# Patient Record
Sex: FEMALE | Race: BLACK OR AFRICAN AMERICAN
[De-identification: names, ages, dates, MRNs, and addresses within clinical notes are randomized per-mention and may not be internally consistent; named-entity substitution may affect disease eponyms.]

---

## 2019-02-12 ENCOUNTER — HOSPITAL ENCOUNTER (EMERGENCY)
Dept: HOSPITAL 74 - JER | Age: 44
Discharge: HOME | End: 2019-02-12
Payer: COMMERCIAL

## 2019-02-12 VITALS — HEART RATE: 76 BPM | SYSTOLIC BLOOD PRESSURE: 117 MMHG | DIASTOLIC BLOOD PRESSURE: 68 MMHG

## 2019-02-12 VITALS — BODY MASS INDEX: 31.5 KG/M2

## 2019-02-12 VITALS — TEMPERATURE: 98 F

## 2019-02-12 DIAGNOSIS — R07.9: Primary | ICD-10-CM

## 2019-02-12 DIAGNOSIS — I10: ICD-10-CM

## 2019-02-12 DIAGNOSIS — R73.03: ICD-10-CM

## 2019-02-12 DIAGNOSIS — E78.5: ICD-10-CM

## 2019-02-12 DIAGNOSIS — M94.0: ICD-10-CM

## 2019-02-12 LAB
ALBUMIN SERPL-MCNC: 3.3 G/DL (ref 3.4–5)
ALP SERPL-CCNC: 44 U/L (ref 45–117)
ALT SERPL-CCNC: 17 U/L (ref 13–61)
ANION GAP SERPL CALC-SCNC: 7 MMOL/L (ref 8–16)
APPEARANCE UR: CLEAR
AST SERPL-CCNC: 15 U/L (ref 15–37)
BASOPHILS # BLD: 0.9 % (ref 0–2)
BILIRUB SERPL-MCNC: 0.2 MG/DL (ref 0.2–1)
BILIRUB UR STRIP.AUTO-MCNC: NEGATIVE MG/DL
BUN SERPL-MCNC: 11 MG/DL (ref 7–18)
CALCIUM SERPL-MCNC: 8.9 MG/DL (ref 8.5–10.1)
CHLORIDE SERPL-SCNC: 106 MMOL/L (ref 98–107)
CO2 SERPL-SCNC: 27 MMOL/L (ref 21–32)
COLOR UR: (no result)
CREAT SERPL-MCNC: 0.7 MG/DL (ref 0.55–1.3)
DEPRECATED RDW RBC AUTO: 15 % (ref 11.6–15.6)
EOSINOPHIL # BLD: 4.7 % (ref 0–4.5)
GLUCOSE SERPL-MCNC: 87 MG/DL (ref 74–106)
HCT VFR BLD CALC: 35.4 % (ref 32.4–45.2)
HGB BLD-MCNC: 12 GM/DL (ref 10.7–15.3)
KETONES UR QL STRIP: NEGATIVE
LEUKOCYTE ESTERASE UR QL STRIP.AUTO: NEGATIVE
LYMPHOCYTES # BLD: 36 % (ref 8–40)
MCH RBC QN AUTO: 26.3 PG (ref 25.7–33.7)
MCHC RBC AUTO-ENTMCNC: 33.9 G/DL (ref 32–36)
MCV RBC: 77.5 FL (ref 80–96)
MONOCYTES # BLD AUTO: 10.8 % (ref 3.8–10.2)
NEUTROPHILS # BLD: 47.6 % (ref 42.8–82.8)
NITRITE UR QL STRIP: NEGATIVE
PH UR: 6 [PH] (ref 5–8)
PLATELET # BLD AUTO: 305 K/MM3 (ref 134–434)
PMV BLD: 8.3 FL (ref 7.5–11.1)
POTASSIUM SERPLBLD-SCNC: 4 MMOL/L (ref 3.5–5.1)
PROT SERPL-MCNC: 7.2 G/DL (ref 6.4–8.2)
PROT UR QL STRIP: NEGATIVE
PROT UR QL STRIP: NEGATIVE
RBC # BLD AUTO: 4.57 M/MM3 (ref 3.6–5.2)
SODIUM SERPL-SCNC: 140 MMOL/L (ref 136–145)
SP GR UR: 1.02 (ref 1.01–1.03)
UROBILINOGEN UR STRIP-MCNC: NEGATIVE MG/DL (ref 0.2–1)
WBC # BLD AUTO: 5.5 K/MM3 (ref 4–10)

## 2019-02-12 PROCEDURE — 3E0337Z INTRODUCTION OF ELECTROLYTIC AND WATER BALANCE SUBSTANCE INTO PERIPHERAL VEIN, PERCUTANEOUS APPROACH: ICD-10-PCS | Performed by: STUDENT IN AN ORGANIZED HEALTH CARE EDUCATION/TRAINING PROGRAM

## 2019-02-12 NOTE — PDOC
History of Present Illness





- General


Chief Complaint: Lightheaded


Stated Complaint: PAIN


Time Seen by Provider: 02/12/19 10:30


History Source: Patient


Exam Limitations: No Limitations





- History of Present Illness


Initial Comments: 








44 yo F w a pmh of hypertension, prediabetes, anemia, and hyperlipidemia 

presents to the ER after she experienced an episode of right sided stabbing 

chest discomfort earlier today which she says radiated up to her neck and down 

her right arm. She says the pain comes out of nowhere and is not associated 

with physical exertion. She does endorse right arm numbness when the discomfort 

comes on. She is not currently experiencing any chest discomfort. When the 

chest discomfort happens she often feels lightheaded. She denies any 

diaphoresis associated with these episodes. She endorses having the flu 3 weeks 

ago, otherwise no recent infections. 





She denies any fevers, chills, SOB, difficulty breathing, dysuria, frequency, 

urgency, abdominal pain, diarrhea, constipation, headache, blurry vision, neck 

pain, leg pain, vertigo, or weakness. 





PCP: Sg Luke


PSH: Myomectomy


Allergies: NKA, NKDA


Social Hx: Denies smoking, drinking, or other substance usage











Past History





- Past Medical History


Allergies/Adverse Reactions: 


 Allergies











Allergy/AdvReac Type Severity Reaction Status Date / Time


 


No Known Drug Allergies Allergy   Verified 02/12/19 10:18











Home Medications: 


Ambulatory Orders





Amlodipine Besylate [Norvasc -] 10 mg PO DAILY 02/12/19 


Metoprolol Succinate 25 mg PO DAILY 02/12/19 








Anemia: Yes


Asthma: No


Cancer: No


Cardiac Disorders: No


CVA: No


COPD: No


CHF: No


Dementia: No


Diabetes: Yes (PRE)


GI Disorders: No


 Disorders: No


HTN: Yes


Hypercholesterolemia: Yes


Liver Disease: No


Seizures: No


Thyroid Disease: No





- Surgical History


Abdominal Surgery: No


Appendectomy: No


Cardiac Surgery: No


Cholecystectomy: No


Lung Surgery: No


Neurologic Surgery: No


Orthopedic Surgery: No





- Suicide/Smoking/Psychosocial Hx


Smoking Status: No


Smoking History: Never smoked


Number of Cigarettes Smoked Daily: 0


Hx Alcohol Use: No


Drug/Substance Use Hx: No


Substance Use Type: None


Hx Substance Use Treatment: No





**Review of Systems





- Review of Systems


Able to Perform ROS?: Yes


Comments:: 








CONSTITUTIONAL:


Absent: fever, no chills, no fatigue


EYES:


Absent: visual changes


ENT:


Absent: ear pain, no sore throat


CARDIOVASCULAR:


Present: Chest pain, lightheadedness. 


Absent: no palpitations


RESPIRATORY:


Absent: cough, no SOB


GI:


Absent: abdominal pain, no nausea, no vomiting, no constipation, no diarrhea


GENITOURINARY:


Absent: dysuria, no frequency, no hematuria


MUSKULOSKELETAL:


Absent: back pain, no arthralgia, no myalgia


SKIN:


Absent: rash


NEURO:


Absent: headache











*Physical Exam





- Vital Signs


 Last Vital Signs











Temp Pulse Resp BP Pulse Ox


 


 98 F   91 H  19   145/95   100 


 


 02/12/19 10:18  02/12/19 10:18  02/12/19 10:18  02/12/19 10:18  02/12/19 10:18














- Physical Exam


Comments: 





GENERAL:


Well-appearing, well-nourished. No apparent distress.


HEENT:


Normocephalic, atraumatic. PERRL, EOM intact.


CARDIOVASCULAR:


Normal S1, S2. Regular rate and rhythm.


CHEST: 


There is point tenderness at the costochondral junction which is reproducible 

and worse with right arm motion. 


PULMONARY:


No evidence of respiratory distress. Lungs clear to auscultation bilaterally. 

No wheezing, rales or rhonchi.


ABDOMEN:


Soft, non-distended, non-tender.


EXTREMITIES:


Normal ROM in all four extremities. No gross deformities.


SKIN:


Warm, dry.  No rash


NEUROLOGICAL:


No focal neurological deficits.














Moderate Sedation





- Procedure Monitoring


Vital Signs: 


Procedure Monitoring Vital Signs











Temperature  98 F   02/12/19 10:18


 


Pulse Rate  91 H  02/12/19 10:18


 


Respiratory Rate  19   02/12/19 10:18


 


Blood Pressure  145/95   02/12/19 10:18


 


O2 Sat by Pulse Oximetry (%)  100   02/12/19 10:18











ED Treatment Course





- LABORATORY


CBC & Chemistry Diagram: 


 02/12/19 11:15





 02/12/19 11:20





Medical Decision Making





- Medical Decision Making





44 yo F w a pmh of hypertension, prediabetes, anemia, and hyperlipidemia 

presents to the ER after she experienced an episode of right sided stabbing 

chest discomfort earlier today which she says radiated up to her neck and down 

her right arm. She says the pain comes out of nowhere and is not associated 

with physical exertion. She does endorse right arm numbness when the discomfort 

comes on. She is not currently experiencing any chest discomfort. When the 

chest discomfort happens she often feels lightheaded. She denies any 

diaphoresis associated with these episodes. She endorses having the flu 3 weeks 

ago, otherwise no recent infections. 





- VSS





DDx IBNLT: ACS/MI, arrhythmia, electrolyte abnormality, costochondritis, post-

viral pleuritis, dehydration, pneumothorax, PNA





Plan: Labs, Urine, CXR, EKG, analgesia, IV hydration, re-assess. 





Labs unremarkable and WNL


CXR clean


EKG shows no ST segment elevation or depression. Not concerning for ACS. 





The point tenderness at the costochondral junction is suggestive of 

costochondritis. 





Patient feels better after analgesia. 


Will DC with cardio follow up. 











*DC/Admit/Observation/Transfer


Diagnosis at time of Disposition: 


 Atypical chest pain, Costochondritis








- Discharge Dispostion


Disposition: HOME


Condition at time of disposition: Improved


Decision to Admit order: No





- Referrals


Referrals: 


Reggie Mcdonald MD [Staff Physician] - 





- Patient Instructions


Printed Discharge Instructions:  DI for Atypical Chest Pain, DI for 

Costochondritis, DI for Chest Pain


Additional Instructions: 


You came into the ER after experiencing some right sided rib discomfort. We 

looked at your blood and urine and found no abnormalities. We also did a chest x

-ray and and electrocardiogram which were normal and had no obvious 

abnormalities. We believe your chest discomfort is musculoskeletal in nature, 

possibly costochondritis - see attached handout for further explanation. We are 

giving you the number for a cardiologist to follow up with. Please make sure to 

schedule an appointment in the next 48 to 72 hours. 





Take advil/motrin/tylenol as needed for pain control. Drink plenty of fluids. 





Come back to the ER if your pain worsens, you get a fever, can't breathe or 

have any other new or worsening concerns. 





Thank you for coming to the [facility] ER. We hope you feel better soon! 


Print Language: ENGLISH





- Post Discharge Activity

## 2019-02-12 NOTE — EKG
Test Reason : 

Blood Pressure : ***/*** mmHG

Vent. Rate : 093 BPM     Atrial Rate : 093 BPM

   P-R Int : 140 ms          QRS Dur : 086 ms

    QT Int : 382 ms       P-R-T Axes : 048 -11 017 degrees

   QTc Int : 474 ms

 

SINUS RHYTHM WITH PREMATURE ATRIAL COMPLEXES WITH ABERRANT CONDUCTION

MINIMAL VOLTAGE CRITERIA FOR LVH, MAY BE NORMAL VARIANT

SEPTAL INFARCT , AGE UNDETERMINED

ABNORMAL ECG

WHEN COMPARED WITH ECG OF 29-APR-2015 15:02,

ABERRANT CONDUCTION IS NOW PRESENT

SEPTAL INFARCT IS NOW PRESENT

NON-SPECIFIC CHANGE IN ST SEGMENT IN ANTERIOR LEADS

Confirmed by Mitul Morfin ([id number]) on 2/12/2019 4:24:13 PM

 

Referred By:             Confirmed By:Mitul Morfin

## 2019-02-12 NOTE — PDOC
Attending Attestation





- Resident


Resident Name: Khoi Gómez





- ED Attending Attestation


I have performed the following: I have examined & evaluated the patient, The 

case was reviewed & discussed with the resident, I agree w/resident's findings 

& plan, Exceptions are as noted





- HPI


HPI: 





02/12/19 11:24


43y F hx of htn, hl, pre-dm presents with complaint of cp, dizziness. Pt states 

she was feeling well this morning, was at work was drinking coffee and got up 

to walk around and noticed she started feeling lightheaded like she was about 

to pass out associated with palpitations, mild R sharp sided chest pain, 

tingling in her arms lasting a few seconds before resolving. associated with 

nausea w/o vomiting, diaphoresis. Pt denies any sob,hemoptysis, cough, vertigo, 

focal weakness, diarrhea, melena, bpr, dysuria, frequency. Pt notes she had 

similar symptoms last year wand went to an ER in [place] and was observed.  She was 

given fu with primary care.  





notes she is currently feeling better.











- Physicial Exam


PE: 





02/12/19 11:28


GENERAL: The patient is awake, alert, and fully oriented, Nontoxic - in no 

acute distress.


HEAD: Normocephalic, atraumatic.


EYES: extraocular movements intact, sclera anicteric, conjunctiva clear.


ENT: Normal voice,  Moist mucous membranes.


NECK: Normal range of motion, supple


LUNGS: Breath sounds equal, clear to auscultation bilaterally.  No wheezes, no 

rhonchi, no rales.


HEART: Regular rate and rhythm, normal S1 and S2 without murmur, rub or gallop.


ABDOMEN: Soft, nontender,  No guarding, no rebound.  . No CVA tenderness


EXTREMITIES: Normal range of motion, no edema.  No clubbing or cyanosis. No 

cords, erythema, or tenderness.


NEUROLOGICAL: No facial assymetry, Normal speech, 


PSYCH: Normal mood, normal affect.


SKIN: Warm, Dry, normal turgor,





- Medical Decision Making





02/12/19 11:28


ddx is wide and includes but is not limieted to arrthmia, acs, vertigo 

metabolic dernagement, anemia


will ck labs, screening ekg and trop


fluids for hydration


bp on both arms are symmetric 138/92 on R arm and 136/86 on L arm, so do not 

think dissection likely (her symptoms of pain lasting for split second then 

resolving is also inconsistent)





will reassess








02/12/19 13:09


pt feelig improved


lbs reivewed


pt does have worsening of her symptoms with movement of her arm and is ttp


suspect possible msk cause


will recommend uspportive care











**Heart Score/ECG Review





- ECG Impressions


Comment:: 





02/12/19 11:46


Twelve-lead EKG was performed and reviewed by me. 


There is normal sinus rhythm with a normal rate. 


rate of 93


The axis is normal. 


The intervals are normal. 


abnormal R wave progression

## 2022-04-03 ENCOUNTER — HOSPITAL ENCOUNTER (EMERGENCY)
Dept: HOSPITAL 74 - JERFT | Age: 47
Discharge: HOME | End: 2022-04-03
Payer: COMMERCIAL

## 2022-04-03 VITALS — SYSTOLIC BLOOD PRESSURE: 124 MMHG | TEMPERATURE: 98 F | HEART RATE: 79 BPM | DIASTOLIC BLOOD PRESSURE: 85 MMHG

## 2022-04-03 VITALS — BODY MASS INDEX: 33.6 KG/M2
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
## 2022-04-03 DIAGNOSIS — S93.401A: Primary | ICD-10-CM

## 2023-08-02 ENCOUNTER — HOSPITAL ENCOUNTER (EMERGENCY)
Dept: HOSPITAL 74 - JER | Age: 48
Discharge: HOME | End: 2023-08-02
Payer: COMMERCIAL

## 2023-08-02 VITALS — HEART RATE: 72 BPM | TEMPERATURE: 98.5 F | SYSTOLIC BLOOD PRESSURE: 138 MMHG | DIASTOLIC BLOOD PRESSURE: 78 MMHG

## 2023-08-02 VITALS — RESPIRATION RATE: 18 BRPM

## 2023-08-02 VITALS — BODY MASS INDEX: 34.2 KG/M2
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
## 2023-08-02 DIAGNOSIS — M25.511: ICD-10-CM

## 2023-08-02 DIAGNOSIS — M25.551: ICD-10-CM

## 2023-08-02 DIAGNOSIS — R53.1: ICD-10-CM

## 2023-08-02 DIAGNOSIS — M25.512: ICD-10-CM

## 2023-08-02 DIAGNOSIS — R11.0: ICD-10-CM
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
## 2023-08-02 DIAGNOSIS — R10.13: Primary | ICD-10-CM

## 2023-08-02 LAB
ALBUMIN SERPL-MCNC: 3.5 G/DL (ref 3.4–5)
ALP SERPL-CCNC: 69 U/L (ref 45–117)
ALT SERPL-CCNC: 32 U/L (ref 13–61)
ANION GAP SERPL CALC-SCNC: 4 MMOL/L (ref 8–16)
APPEARANCE UR: CLEAR
AST SERPL-CCNC: 19 U/L (ref 15–37)
BASOPHILS # BLD: 0.4 % (ref 0–2)
BILIRUB SERPL-MCNC: 0.2 MG/DL (ref 0.2–1)
BILIRUB UR STRIP.AUTO-MCNC: NEGATIVE MG/DL
BUN SERPL-MCNC: 11.5 MG/DL (ref 7–18)
CALCIUM SERPL-MCNC: 9.5 MG/DL (ref 8.5–10.1)
CHLORIDE SERPL-SCNC: 108 MMOL/L (ref 98–107)
CO2 SERPL-SCNC: 29 MMOL/L (ref 21–32)
COLOR UR: YELLOW
CREAT SERPL-MCNC: 0.8 MG/DL (ref 0.55–1.3)
DEPRECATED RDW RBC AUTO: 14.1 % (ref 11.6–15.6)
EOSINOPHIL # BLD: 2.1 % (ref 0–4.5)
GLUCOSE SERPL-MCNC: 108 MG/DL (ref 74–106)
HCT VFR BLD CALC: 40.8 % (ref 32.4–45.2)
HGB BLD-MCNC: 14 GM/DL (ref 10.7–15.3)
INR BLD: 1.08 (ref 0.83–1.09)
KETONES UR QL STRIP: NEGATIVE
LEUKOCYTE ESTERASE UR QL STRIP.AUTO: NEGATIVE
LYMPHOCYTES # BLD: 34 % (ref 8–40)
MCH RBC QN AUTO: 27.9 PG (ref 25.7–33.7)
MCHC RBC AUTO-ENTMCNC: 34.4 G/DL (ref 32–36)
MCV RBC: 81.3 FL (ref 80–96)
MONOCYTES # BLD AUTO: 6.9 % (ref 3.8–10.2)
NEUTROPHILS # BLD: 56.6 % (ref 42.8–82.8)
NITRITE UR QL STRIP: NEGATIVE
PH UR: 6 [PH] (ref 5–8)
PLATELET # BLD AUTO: 280 10^3/UL (ref 134–434)
PMV BLD: 8.7 FL (ref 7.5–11.1)
POTASSIUM SERPLBLD-SCNC: 4.3 MMOL/L (ref 3.5–5.1)
PROT SERPL-MCNC: 7.6 G/DL (ref 6.4–8.2)
PROT UR QL STRIP: NEGATIVE
PROT UR QL STRIP: NEGATIVE
PT PNL PPP: 12.5 SEC (ref 9.7–13)
RBC # BLD AUTO: 5.02 M/MM3 (ref 3.6–5.2)
SODIUM SERPL-SCNC: 141 MMOL/L (ref 136–145)
SP GR UR: 1.01 (ref 1.01–1.03)
UROBILINOGEN UR STRIP-MCNC: 0.2 MG/DL (ref 0.2–1)
WBC # BLD AUTO: 6.9 K/MM3 (ref 4–10)

## 2023-08-02 PROCEDURE — 3E033NZ INTRODUCTION OF ANALGESICS, HYPNOTICS, SEDATIVES INTO PERIPHERAL VEIN, PERCUTANEOUS APPROACH: ICD-10-PCS
